# Patient Record
Sex: MALE | Race: OTHER
[De-identification: names, ages, dates, MRNs, and addresses within clinical notes are randomized per-mention and may not be internally consistent; named-entity substitution may affect disease eponyms.]

---

## 2018-02-18 ENCOUNTER — HOSPITAL ENCOUNTER (EMERGENCY)
Dept: HOSPITAL 72 - EMR | Age: 23
Discharge: ANOTHER HOSPITAL | End: 2018-02-18
Payer: COMMERCIAL

## 2018-02-18 VITALS — DIASTOLIC BLOOD PRESSURE: 52 MMHG | SYSTOLIC BLOOD PRESSURE: 90 MMHG

## 2018-02-18 VITALS — DIASTOLIC BLOOD PRESSURE: 71 MMHG | SYSTOLIC BLOOD PRESSURE: 116 MMHG

## 2018-02-18 VITALS — DIASTOLIC BLOOD PRESSURE: 58 MMHG | SYSTOLIC BLOOD PRESSURE: 110 MMHG

## 2018-02-18 VITALS — DIASTOLIC BLOOD PRESSURE: 59 MMHG | SYSTOLIC BLOOD PRESSURE: 103 MMHG

## 2018-02-18 VITALS — SYSTOLIC BLOOD PRESSURE: 124 MMHG | DIASTOLIC BLOOD PRESSURE: 65 MMHG

## 2018-02-18 VITALS — HEIGHT: 70 IN | WEIGHT: 275 LBS | BODY MASS INDEX: 39.37 KG/M2

## 2018-02-18 VITALS — SYSTOLIC BLOOD PRESSURE: 107 MMHG | DIASTOLIC BLOOD PRESSURE: 59 MMHG

## 2018-02-18 VITALS — SYSTOLIC BLOOD PRESSURE: 97 MMHG | DIASTOLIC BLOOD PRESSURE: 57 MMHG

## 2018-02-18 DIAGNOSIS — I44.4: ICD-10-CM

## 2018-02-18 DIAGNOSIS — K85.90: Primary | ICD-10-CM

## 2018-02-18 LAB
ADD MANUAL DIFF: NO
ALBUMIN SERPL-MCNC: 3.7 G/DL (ref 3.4–5)
ALBUMIN/GLOB SERPL: 1.1 {RATIO} (ref 1–2.7)
ALP SERPL-CCNC: 99 U/L (ref 46–116)
ALT SERPL-CCNC: 38 U/L (ref 12–78)
ANION GAP SERPL CALC-SCNC: 6 MMOL/L (ref 5–15)
AST SERPL-CCNC: 20 U/L (ref 15–37)
BASOPHILS NFR BLD AUTO: 1.2 % (ref 0–2)
BILIRUB SERPL-MCNC: 0.5 MG/DL (ref 0.2–1)
BUN SERPL-MCNC: 10 MG/DL (ref 7–18)
CALCIUM SERPL-MCNC: 9.1 MG/DL (ref 8.5–10.1)
CHLORIDE SERPL-SCNC: 104 MMOL/L (ref 98–107)
CK MB SERPL-MCNC: 3 NG/ML (ref 0–3.6)
CK SERPL-CCNC: 177 U/L (ref 26–308)
CO2 SERPL-SCNC: 29 MMOL/L (ref 21–32)
CREAT SERPL-MCNC: 0.9 MG/DL (ref 0.55–1.3)
EOSINOPHIL NFR BLD AUTO: 1.8 % (ref 0–3)
ERYTHROCYTE [DISTWIDTH] IN BLOOD BY AUTOMATED COUNT: 11.8 % (ref 11.6–14.8)
GLOBULIN SER-MCNC: 3.5 G/DL
HCT VFR BLD CALC: 49 % (ref 42–52)
HGB BLD-MCNC: 17 G/DL (ref 14.2–18)
LYMPHOCYTES NFR BLD AUTO: 37.1 % (ref 20–45)
MCV RBC AUTO: 92 FL (ref 80–99)
MONOCYTES NFR BLD AUTO: 8.6 % (ref 1–10)
NEUTROPHILS NFR BLD AUTO: 51.3 % (ref 45–75)
PLATELET # BLD: 195 K/UL (ref 150–450)
POTASSIUM SERPL-SCNC: 4.1 MMOL/L (ref 3.5–5.1)
RBC # BLD AUTO: 5.33 M/UL (ref 4.7–6.1)
SODIUM SERPL-SCNC: 139 MMOL/L (ref 136–145)
WBC # BLD AUTO: 6.7 K/UL (ref 4.8–10.8)

## 2018-02-18 PROCEDURE — 71045 X-RAY EXAM CHEST 1 VIEW: CPT

## 2018-02-18 PROCEDURE — 96374 THER/PROPH/DIAG INJ IV PUSH: CPT

## 2018-02-18 PROCEDURE — 80307 DRUG TEST PRSMV CHEM ANLYZR: CPT

## 2018-02-18 PROCEDURE — 85379 FIBRIN DEGRADATION QUANT: CPT

## 2018-02-18 PROCEDURE — 94664 DEMO&/EVAL PT USE INHALER: CPT

## 2018-02-18 PROCEDURE — 84484 ASSAY OF TROPONIN QUANT: CPT

## 2018-02-18 PROCEDURE — 36415 COLL VENOUS BLD VENIPUNCTURE: CPT

## 2018-02-18 PROCEDURE — 82553 CREATINE MB FRACTION: CPT

## 2018-02-18 PROCEDURE — 82550 ASSAY OF CK (CPK): CPT

## 2018-02-18 PROCEDURE — 83690 ASSAY OF LIPASE: CPT

## 2018-02-18 PROCEDURE — 99285 EMERGENCY DEPT VISIT HI MDM: CPT

## 2018-02-18 PROCEDURE — 80053 COMPREHEN METABOLIC PANEL: CPT

## 2018-02-18 PROCEDURE — 93005 ELECTROCARDIOGRAM TRACING: CPT

## 2018-02-18 PROCEDURE — 94640 AIRWAY INHALATION TREATMENT: CPT

## 2018-02-18 PROCEDURE — 85025 COMPLETE CBC W/AUTO DIFF WBC: CPT

## 2018-02-18 NOTE — EMERGENCY ROOM REPORT
History of Present Illness


General


Chief Complaint:  Chest Pain


Source:  Patient





Present Illness


HPI


Patient is a 22-year-old male presented after increased chest pain.  The 

patient was having pleuritic type pain.  This is an onset approximately 2 hours 

prior to arrival.  Patient had not been vomiting.  He denies any positional 

changes in the pain.  He denies any pain with exertion.


Allergies:  


Coded Allergies:  


     No Known Allergies (Unverified , 2/18/18)





Patient History


Reviewed Nursing Documentation:  PMH: Agreed, PSxH: Agreed





Nursing Documentation-PMH


Past Medical History:  No Stated History





Review of Systems


All Other Systems:  negative except mentioned in HPI





Physical Exam





Vital Signs








  Date Time  Temp Pulse Resp B/P (MAP) Pulse Ox O2 Delivery O2 Flow Rate FiO2


 


2/18/18 13:05 97.7 72 18 125/73 97 Room Air  





 97.7       


 


2/18/18 13:52        21








Sp02 EP Interpretation:  reviewed, normal


General Appearance:  normal inspection, well appearing, no apparent distress, 

alert, GCS 15, non-toxic


Head:  atraumatic


ENT:  normal ENT inspection, hearing grossly normal, normal voice


Neck:  normal inspection, full range of motion, supple, no bony tend


Respiratory:  normal inspection, lungs clear, normal breath sounds, no 

respiratory distress, no retraction, no wheezing


Cardiovascular #1:  regular rate, rhythm, no edema


Gastrointestinal:  normal inspection, normal bowel sounds, non tender, soft, no 

guarding, no hernia


Genitourinary:  no CVA tenderness


Musculoskeletal:  normal inspection, back normal, normal range of motion


Neurologic:  normal inspection, alert, oriented x3, responsive, CNs III-XII nml 

as tested, speech normal


Psychiatric:  normal inspection, judgement/insight normal, mood/affect normal


Skin:  normal inspection, normal color, no rash





Medical Decision Making


Diagnostic Impression:  


 Primary Impression:  


 Pancreatitis, acute


ER Course


Patient was sent for chest pain.Differential diagnosis included but was not 

limited to acute coronary syndrome, pulmonary embolism, pneumonia, aortic 

dissection, shingles, pneumothorax, aortic dissection, esophageal rupture, 

pericarditis. Because of complexity of patient's case laboratory testing and 

imaging studies were ordered.Laboratory studies was notable for a markedly 

elevated lipase consistent with pancreatitis.  Patient denies recent alcohol 

use.  The patient was started on IV fluids.  The patient noted have elevated 

lipase is normal white count and normal liver function tests.  The patient was 

endorsed to Dr. Kothakota for final dispositon.  I anticipate patient will be 

hospitalized for further evaluation of pancreatitis.





Labs








Test


  2/18/18


13:25 2/18/18


13:40


 


White Blood Count


  6.7 K/UL


(4.8-10.8) 


 


 


Red Blood Count


  5.33 M/UL


(4.70-6.10) 


 


 


Hemoglobin


  17.0 G/DL


(14.2-18.0) 


 


 


Hematocrit


  49.0 %


(42.0-52.0) 


 


 


Mean Corpuscular Volume 92 FL (80-99)  


 


Mean Corpuscular Hemoglobin


  32.0 PG


(27.0-31.0) 


 


 


Mean Corpuscular Hemoglobin


Concent 34.8 G/DL


(32.0-36.0) 


 


 


Red Cell Distribution Width


  11.8 %


(11.6-14.8) 


 


 


Platelet Count


  195 K/UL


(150-450) 


 


 


Mean Platelet Volume


  10.1 FL


(6.5-10.1) 


 


 


Neutrophils (%) (Auto)


  51.3 %


(45.0-75.0) 


 


 


Lymphocytes (%) (Auto)


  37.1 %


(20.0-45.0) 


 


 


Monocytes (%) (Auto)


  8.6 %


(1.0-10.0) 


 


 


Eosinophils (%) (Auto)


  1.8 %


(0.0-3.0) 


 


 


Basophils (%) (Auto)


  1.2 %


(0.0-2.0) 


 


 


D-Dimer


  0.19 mg/L FEU


(0.00-0.49) 


 


 


Sodium Level


  139 MMOL/L


(136-145) 


 


 


Potassium Level


  4.1 MMOL/L


(3.5-5.1) 


 


 


Chloride Level


  104 MMOL/L


() 


 


 


Carbon Dioxide Level


  29 MMOL/L


(21-32) 


 


 


Anion Gap


  6 mmol/L


(5-15) 


 


 


Blood Urea Nitrogen


  10 mg/dL


(7-18) 


 


 


Creatinine


  0.9 MG/DL


(0.55-1.30) 


 


 


Estimat Glomerular Filtration


Rate > 60 mL/min


(>60) 


 


 


Glucose Level


  94 MG/DL


() 


 


 


Calcium Level


  9.1 MG/DL


(8.5-10.1) 


 


 


Total Bilirubin


  0.5 MG/DL


(0.2-1.0) 


 


 


Aspartate Amino Transf


(AST/SGOT) 20 U/L (15-37) 


  


 


 


Alanine Aminotransferase


(ALT/SGPT) 38 U/L (12-78) 


  


 


 


Alkaline Phosphatase


  99 U/L


() 


 


 


Total Creatine Kinase


  177 U/L


() 


 


 


Creatine Kinase MB


  3.0 NG/ML


(0.0-3.6) 


 


 


Creatine Kinase MB Relative


Index 1.6 


  


 


 


Troponin I


  0.000 ng/mL


(0.000-0.056) 


 


 


Total Protein


  7.2 G/DL


(6.4-8.2) 


 


 


Albumin


  3.7 G/DL


(3.4-5.0) 


 


 


Globulin 3.5 g/dL  


 


Albumin/Globulin Ratio 1.1 (1.0-2.7)  


 


Lipase


  1260 U/L


() 


 


 


Urine Opiates Screen


  


  Negative


(NEGATIVE)


 


Urine Barbiturates Screen


  


  Negative


(NEGATIVE)


 


Phencyclidine (PCP) Screen


  


  Negative


(NEGATIVE)


 


Urine Amphetamines Screen


  


  Negative


(NEGATIVE)


 


Urine Benzodiazepines Screen


  


  Negative


(NEGATIVE)


 


Urine Cocaine Screen


  


  Negative


(NEGATIVE)


 


Urine Marijuana (THC) Screen


  


  Negative


(NEGATIVE)








EKG Diagnostic Results


Rate:  normal


Rhythm:  NSR


ST Segments:  no acute changes


ASA given to the pt in ED:  No





Rhythm Strip Diag. Results


EP Interpretation:  yes


Rhythm:  NSR, no PVC's, no ectopy





Last Vital Signs








  Date Time  Temp Pulse Resp B/P (MAP) Pulse Ox O2 Delivery O2 Flow Rate FiO2


 


2/18/18 14:01  76 20  100 Room Air  21


 


2/18/18 13:43 97.9       


 


2/18/18 13:15    124/65    








Status:  unchanged


Disposition:  ADMITTED AS INPATIENT


Condition:  Reid Dias Feb 18, 2018 14:27

## 2018-02-19 NOTE — DIAGNOSTIC IMAGING REPORT
Indication: Reason For Exam: SOB

 

Technique: One view of the chest

 

Comparison: none

 

Findings: Lungs and pleural spaces are clear. Heart size is normal

 

Impression: No acute process

## 2018-02-20 NOTE — CARDIOLOGY REPORT
--------------- APPROVED REPORT --------------





EKG Measurement

Heart [id number]

[id number]

[id number]

[id number]

[id number]





Normal sinus rhythm

Left anterior fascicular block

Abnormal ECG

## 2018-02-21 NOTE — DISCHARGE SUMMARY
Discharge Summary


Hospital Course


Date of Admission


Feb 18, 2018 at 17:05


Date of Discharge


Feb 18, 2018 at 19:00


Admitting Diagnosis


PANCREATITIS


HPI


Danilo Barreto is a 22 year old male who was admitted on Feb 18, 2018 at 17:05 

for Pancreatitis


Hospital Course


dc summary #[id number]





Discharge


Condition Upon Discharge:  stable


Discharge Disposition


Patient was transferred to [facility] as per insurance plan


Discharge Diagnoses:  





Discharge Instructions


Discharge Instructions


Special Instructions


I have been assigned to complete a D/C Summary on this account. I was not 

involved in the patient management











Rena Guillen NP (Vanchtein) Feb 21, 2018 11:49

## 2018-02-22 NOTE — DISCHARGE SUMMARY 2 SIG
DATE OF ADMISSION:  02/18/2018



DATE OF DISCHARGE:  02/18/2018



REASON FOR ADMISSION:  This is a 22-year-old male, presented to emergency

department with complaint of pleuritic type chest pain started about two

hours prior to presentation.  No pain with exertion.  Vital signs were stable.  

No leukocytosis.  Stable hemoglobin and hematocrit. Stable electrolytes.  

Troponin negative.  D-dimer within normal limits. EKG revealed no acute 

ischemic changes.  Urine tox screen was negative. Lipase was elevated -1260 

with normal LFTs.  Chest x-ray revealed no acute cardiopulmonary pathology.  

The patient was admitted with acute pancreatitis.



HOSPITAL COURSE:  During the patient's stay in the emergency department,

transfer was arranged to [facility] as per insurance plan.  

The patient denied recent alcohol use.  The patient was started on

the IV fluids.  The patient was transferred to [facility] for further management.



DISCHARGE DIAGNOSIS:  Acute pancreatitis.



DISCHARGE MEDICATIONS: List of medications  given in ED was sent to 

admitting facility. 



DISCHARGE INSTRUCTIONS: The patient was transferred to [facility] as per insurance plan. The patient to follow up 

with medical doctor at the accepting facility.







  ______________________________________________

  Ravinder Pappas M.D.



I have been assigned to dictate discharge summary on this account and I

was not involved in the patient's management.



  ______________________________________________

  Rena Azarduyen N.PCarmencita





DR:  Jasen

D:  02/21/2018 11:48

T:  02/22/2018 02:39

JOB#:  [id number]

CC:



[id number]